# Patient Record
Sex: FEMALE | Race: BLACK OR AFRICAN AMERICAN | NOT HISPANIC OR LATINO | URBAN - METROPOLITAN AREA
[De-identification: names, ages, dates, MRNs, and addresses within clinical notes are randomized per-mention and may not be internally consistent; named-entity substitution may affect disease eponyms.]

---

## 2017-08-31 ENCOUNTER — EMERGENCY (EMERGENCY)
Facility: HOSPITAL | Age: 47
LOS: 0 days | Discharge: HOME | End: 2017-08-31
Attending: EMERGENCY MEDICINE
Payer: MEDICAID

## 2017-08-31 VITALS
HEART RATE: 88 BPM | HEIGHT: 66 IN | SYSTOLIC BLOOD PRESSURE: 129 MMHG | DIASTOLIC BLOOD PRESSURE: 73 MMHG | TEMPERATURE: 99 F | RESPIRATION RATE: 16 BRPM | OXYGEN SATURATION: 100 % | WEIGHT: 153 LBS

## 2017-08-31 VITALS
HEART RATE: 85 BPM | SYSTOLIC BLOOD PRESSURE: 122 MMHG | RESPIRATION RATE: 15 BRPM | DIASTOLIC BLOOD PRESSURE: 75 MMHG | OXYGEN SATURATION: 100 % | TEMPERATURE: 97 F

## 2017-08-31 DIAGNOSIS — R53.1 WEAKNESS: ICD-10-CM

## 2017-08-31 DIAGNOSIS — D64.9 ANEMIA, UNSPECIFIED: ICD-10-CM

## 2017-08-31 DIAGNOSIS — R42 DIZZINESS AND GIDDINESS: ICD-10-CM

## 2017-08-31 LAB
ALBUMIN SERPL ELPH-MCNC: 3.6 G/DL — SIGNIFICANT CHANGE UP (ref 3.3–5)
ALP SERPL-CCNC: 67 U/L — SIGNIFICANT CHANGE UP (ref 40–120)
ALT FLD-CCNC: 19 U/L — SIGNIFICANT CHANGE UP (ref 12–78)
ANION GAP SERPL CALC-SCNC: 10 MMOL/L — SIGNIFICANT CHANGE UP (ref 5–17)
APPEARANCE UR: CLEAR — SIGNIFICANT CHANGE UP
AST SERPL-CCNC: 18 U/L — SIGNIFICANT CHANGE UP (ref 15–37)
BASOPHILS # BLD AUTO: 0.1 K/UL — SIGNIFICANT CHANGE UP (ref 0–0.2)
BASOPHILS NFR BLD AUTO: 1.2 % — SIGNIFICANT CHANGE UP (ref 0–2)
BILIRUB SERPL-MCNC: 0.1 MG/DL — LOW (ref 0.2–1.2)
BILIRUB UR-MCNC: NEGATIVE — SIGNIFICANT CHANGE UP
BUN SERPL-MCNC: 9 MG/DL — SIGNIFICANT CHANGE UP (ref 7–23)
CALCIUM SERPL-MCNC: 8.9 MG/DL — SIGNIFICANT CHANGE UP (ref 8.5–10.1)
CHLORIDE SERPL-SCNC: 106 MMOL/L — SIGNIFICANT CHANGE UP (ref 96–108)
CO2 SERPL-SCNC: 26 MMOL/L — SIGNIFICANT CHANGE UP (ref 22–31)
COLOR SPEC: YELLOW — SIGNIFICANT CHANGE UP
CREAT SERPL-MCNC: 0.61 MG/DL — SIGNIFICANT CHANGE UP (ref 0.5–1.3)
DIFF PNL FLD: NEGATIVE — SIGNIFICANT CHANGE UP
EOSINOPHIL # BLD AUTO: 0.1 K/UL — SIGNIFICANT CHANGE UP (ref 0–0.5)
EOSINOPHIL NFR BLD AUTO: 1.5 % — SIGNIFICANT CHANGE UP (ref 0–6)
GLUCOSE SERPL-MCNC: 93 MG/DL — SIGNIFICANT CHANGE UP (ref 70–99)
GLUCOSE UR QL: NEGATIVE MG/DL — SIGNIFICANT CHANGE UP
HCG SERPL-ACNC: <1 MIU/ML — SIGNIFICANT CHANGE UP
HCT VFR BLD CALC: 35.9 % — SIGNIFICANT CHANGE UP (ref 34.5–45)
HGB BLD-MCNC: 11.6 G/DL — SIGNIFICANT CHANGE UP (ref 11.5–15.5)
KETONES UR-MCNC: NEGATIVE — SIGNIFICANT CHANGE UP
LEUKOCYTE ESTERASE UR-ACNC: NEGATIVE — SIGNIFICANT CHANGE UP
LIDOCAIN IGE QN: 294 U/L — SIGNIFICANT CHANGE UP (ref 73–393)
LYMPHOCYTES # BLD AUTO: 3.1 K/UL — SIGNIFICANT CHANGE UP (ref 1–3.3)
LYMPHOCYTES # BLD AUTO: 35.2 % — SIGNIFICANT CHANGE UP (ref 13–44)
MCHC RBC-ENTMCNC: 28.5 PG — SIGNIFICANT CHANGE UP (ref 27–34)
MCHC RBC-ENTMCNC: 32.4 GM/DL — SIGNIFICANT CHANGE UP (ref 32–36)
MCV RBC AUTO: 88 FL — SIGNIFICANT CHANGE UP (ref 80–100)
MONOCYTES # BLD AUTO: 0.7 K/UL — SIGNIFICANT CHANGE UP (ref 0–0.9)
MONOCYTES NFR BLD AUTO: 7.5 % — SIGNIFICANT CHANGE UP (ref 2–14)
NEUTROPHILS # BLD AUTO: 4.8 K/UL — SIGNIFICANT CHANGE UP (ref 1.8–7.4)
NEUTROPHILS NFR BLD AUTO: 54.6 % — SIGNIFICANT CHANGE UP (ref 43–77)
NITRITE UR-MCNC: NEGATIVE — SIGNIFICANT CHANGE UP
PH UR: 6 — SIGNIFICANT CHANGE UP (ref 5–8)
PLATELET # BLD AUTO: 279 K/UL — SIGNIFICANT CHANGE UP (ref 150–400)
POTASSIUM SERPL-MCNC: 4.3 MMOL/L — SIGNIFICANT CHANGE UP (ref 3.5–5.3)
POTASSIUM SERPL-SCNC: 4.3 MMOL/L — SIGNIFICANT CHANGE UP (ref 3.5–5.3)
PROT SERPL-MCNC: 7.1 GM/DL — SIGNIFICANT CHANGE UP (ref 6–8.3)
PROT UR-MCNC: NEGATIVE MG/DL — SIGNIFICANT CHANGE UP
RBC # BLD: 4.08 M/UL — SIGNIFICANT CHANGE UP (ref 3.8–5.2)
RBC # FLD: 12.7 % — SIGNIFICANT CHANGE UP (ref 11–15)
SODIUM SERPL-SCNC: 142 MMOL/L — SIGNIFICANT CHANGE UP (ref 135–145)
SP GR SPEC: 1.01 — SIGNIFICANT CHANGE UP (ref 1.01–1.02)
UROBILINOGEN FLD QL: NEGATIVE MG/DL — SIGNIFICANT CHANGE UP
WBC # BLD: 8.8 K/UL — SIGNIFICANT CHANGE UP (ref 3.8–10.5)
WBC # FLD AUTO: 8.8 K/UL — SIGNIFICANT CHANGE UP (ref 3.8–10.5)

## 2017-08-31 PROCEDURE — 99284 EMERGENCY DEPT VISIT MOD MDM: CPT

## 2017-08-31 RX ORDER — SODIUM CHLORIDE 9 MG/ML
1000 INJECTION INTRAMUSCULAR; INTRAVENOUS; SUBCUTANEOUS ONCE
Qty: 0 | Refills: 0 | Status: COMPLETED | OUTPATIENT
Start: 2017-08-31 | End: 2017-08-31

## 2017-08-31 RX ADMIN — SODIUM CHLORIDE 1000 MILLILITER(S): 9 INJECTION INTRAMUSCULAR; INTRAVENOUS; SUBCUTANEOUS at 16:30

## 2017-08-31 NOTE — ED PROVIDER NOTE - OBJECTIVE STATEMENT
Pt is a 48 yo lady with no significant past medical history who presents to the ED with generalized weakness for the past 2 weeks. She has felt lightheaded, no loc. No n/v/d, has had no fevers, no dysuria. She had a D and C for fibroids 8/1 and had vaginal bleeding earlier, and then now has spotting. Has a hx of anemia, never needed blood transfusion.

## 2017-08-31 NOTE — ED ADULT NURSE NOTE - OBJECTIVE STATEMENT
pt alert and oriented x4, pt c/o generalised weakness, pt post d/c 1 month ago with on and off spotting states she is also anemic

## 2017-08-31 NOTE — ED ADULT TRIAGE NOTE - CHIEF COMPLAINT QUOTE
Pt c/o generalize weakness for a few weeks since having D&C 1 month ago. Pt c/o sob on exertion. Pt states she have trouble getting words out for a few weeks. Pt denies any chest pain at triage, no apparent respiratory distress noted

## 2017-09-01 LAB
CULTURE RESULTS: NO GROWTH — SIGNIFICANT CHANGE UP
SPECIMEN SOURCE: SIGNIFICANT CHANGE UP

## 2018-01-25 ENCOUNTER — EMERGENCY (EMERGENCY)
Facility: HOSPITAL | Age: 48
LOS: 0 days | Discharge: HOME | End: 2018-01-25
Attending: EMERGENCY MEDICINE
Payer: MEDICAID

## 2018-01-25 VITALS
TEMPERATURE: 98 F | RESPIRATION RATE: 16 BRPM | OXYGEN SATURATION: 99 % | SYSTOLIC BLOOD PRESSURE: 113 MMHG | HEART RATE: 74 BPM | DIASTOLIC BLOOD PRESSURE: 61 MMHG

## 2018-01-25 VITALS
SYSTOLIC BLOOD PRESSURE: 110 MMHG | HEIGHT: 66 IN | RESPIRATION RATE: 18 BRPM | TEMPERATURE: 99 F | WEIGHT: 154.98 LBS | DIASTOLIC BLOOD PRESSURE: 66 MMHG | HEART RATE: 81 BPM | OXYGEN SATURATION: 99 %

## 2018-01-25 DIAGNOSIS — M25.512 PAIN IN LEFT SHOULDER: ICD-10-CM

## 2018-01-25 DIAGNOSIS — M79.1 MYALGIA: ICD-10-CM

## 2018-01-25 DIAGNOSIS — E23.6 OTHER DISORDERS OF PITUITARY GLAND: ICD-10-CM

## 2018-01-25 DIAGNOSIS — D64.9 ANEMIA, UNSPECIFIED: ICD-10-CM

## 2018-01-25 DIAGNOSIS — G89.29 OTHER CHRONIC PAIN: ICD-10-CM

## 2018-01-25 PROCEDURE — 99284 EMERGENCY DEPT VISIT MOD MDM: CPT

## 2018-01-25 PROCEDURE — 93010 ELECTROCARDIOGRAM REPORT: CPT

## 2018-01-25 PROCEDURE — 73030 X-RAY EXAM OF SHOULDER: CPT | Mod: 26,LT

## 2018-01-25 RX ORDER — BROMOCRIPTINE MESYLATE 5 MG/1
0 CAPSULE ORAL
Qty: 0 | Refills: 0 | COMMUNITY

## 2018-01-25 RX ORDER — IBUPROFEN 200 MG
1 TABLET ORAL
Qty: 28 | Refills: 0 | OUTPATIENT
Start: 2018-01-25 | End: 2018-01-31

## 2018-01-25 NOTE — ED ADULT NURSE NOTE - OBJECTIVE STATEMENT
received er bed 21 c/o pain l side of neck radiating to l shoulder and l chest intermittantly x 2 weeks, worse last night seen at pmd for same c/o 2 days ago with labs done, results pending denies sob at present, states dyspnea upon exertion lungs clear bilat denies known injury or recent trauma

## 2018-01-25 NOTE — ED ADULT TRIAGE NOTE - CHIEF COMPLAINT QUOTE
Complains of intermittent  left shoulder pain radiating to the left arm with associated numbness x 2 weeks. denies any injury. Complains of intermittent  left shoulder pain radiating to the left arm with associated numbness x 2 weeks. denies any injury. Denies any chest pain

## 2018-01-25 NOTE — ED ADULT NURSE NOTE - CHIEF COMPLAINT QUOTE
Complains of intermittent  left shoulder pain radiating to the left arm with associated numbness x 2 weeks. denies any injury. Denies any chest pain

## 2018-01-25 NOTE — ED PROVIDER NOTE - OBJECTIVE STATEMENT
Pt is a 48 yo lady with a pmhx of pre dm who presents to the ED with l. shoulder pain for 2 weeks. Pain shoots down l. arm intermittently. Sore. No chest pain, no sob. 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 No numbness or tingling. No neck pain, no hx of dvt/pe.

## 2022-02-24 NOTE — ED ADULT NURSE NOTE - FALL HARM RISK TYPE OF ASSESSMENT
Per EMS sent to ED per  request to be evaluated for possible UTI and concerns about marijuana use daily.     Admission

## 2022-03-09 ENCOUNTER — OFFICE VISIT (OUTPATIENT)
Dept: URBAN - METROPOLITAN AREA CLINIC 44 | Facility: CLINIC | Age: 52
End: 2022-03-09

## 2022-03-31 ENCOUNTER — OFFICE VISIT (OUTPATIENT)
Dept: URBAN - METROPOLITAN AREA CLINIC 46 | Facility: CLINIC | Age: 52
End: 2022-03-31

## 2022-03-31 RX ORDER — NEOMYCIN AND POLYMYXIN B SULFATES AND DEXAMETHASONE 3.5; 10000; 1 MG/G; [IU]/G; MG/G
APPLY AS DIRECTED IN BOTH EYES DAILY AT BEDTIME OINTMENT OPHTHALMIC
Qty: 4 | Refills: 0 | Status: ACTIVE | COMMUNITY

## 2022-05-06 ENCOUNTER — OFFICE VISIT (OUTPATIENT)
Dept: URBAN - METROPOLITAN AREA CLINIC 46 | Facility: CLINIC | Age: 52
End: 2022-05-06

## 2022-05-31 ENCOUNTER — CLAIMS CREATED FROM THE CLAIM WINDOW (OUTPATIENT)
Dept: URBAN - METROPOLITAN AREA CLINIC 48 | Facility: CLINIC | Age: 52
End: 2022-05-31
Payer: COMMERCIAL

## 2022-05-31 ENCOUNTER — LAB OUTSIDE AN ENCOUNTER (OUTPATIENT)
Dept: URBAN - METROPOLITAN AREA CLINIC 48 | Facility: CLINIC | Age: 52
End: 2022-05-31

## 2022-05-31 ENCOUNTER — DASHBOARD ENCOUNTERS (OUTPATIENT)
Age: 52
End: 2022-05-31

## 2022-05-31 VITALS
WEIGHT: 162.2 LBS | DIASTOLIC BLOOD PRESSURE: 77 MMHG | HEIGHT: 66 IN | SYSTOLIC BLOOD PRESSURE: 117 MMHG | HEART RATE: 66 BPM | BODY MASS INDEX: 26.07 KG/M2 | TEMPERATURE: 97.7 F

## 2022-05-31 DIAGNOSIS — K62.5 RECTAL BLEEDING: ICD-10-CM

## 2022-05-31 DIAGNOSIS — L29.0 RECTAL ITCHING: ICD-10-CM

## 2022-05-31 DIAGNOSIS — R09.89 GLOBUS SENSATION: ICD-10-CM

## 2022-05-31 DIAGNOSIS — K59.01 SLOW TRANSIT CONSTIPATION: ICD-10-CM

## 2022-05-31 DIAGNOSIS — Z86.010 PERSONAL HISTORY OF COLONIC POLYPS: ICD-10-CM

## 2022-05-31 DIAGNOSIS — K21.9 GASTROESOPHAGEAL REFLUX DISEASE WITHOUT ESOPHAGITIS: ICD-10-CM

## 2022-05-31 DIAGNOSIS — K59.09 CHANGE IN BOWEL MOVEMENTS INTERMITTENT CONSTIPATION. URGENCY IN THE MORNING.: ICD-10-CM

## 2022-05-31 DIAGNOSIS — Z83.79 FAMILY HISTORY OF BARRETT'S ESOPHAGUS: ICD-10-CM

## 2022-05-31 PROCEDURE — 99204 OFFICE O/P NEW MOD 45 MIN: CPT | Performed by: NURSE PRACTITIONER

## 2022-05-31 PROCEDURE — 99204 OFFICE O/P NEW MOD 45 MIN: CPT | Performed by: INTERNAL MEDICINE

## 2022-05-31 RX ORDER — LINACLOTIDE 145 UG/1
1 CAPSULE AT LEAST 30 MINUTES BEFORE THE FIRST MEAL OF THE DAY ON AN EMPTY STOMACH CAPSULE, GELATIN COATED ORAL ONCE A DAY
Qty: 30 | Refills: 6 | OUTPATIENT

## 2022-05-31 RX ORDER — HYDROCORTISONE 25 MG/G
1 APPLICATION CREAM TOPICAL TWICE A DAY
Qty: 1 TUBE | Refills: 2 | OUTPATIENT

## 2022-05-31 RX ORDER — LINACLOTIDE 145 UG/1
1 CAPSULE AT LEAST 30 MINUTES BEFORE THE FIRST MEAL OF THE DAY ON AN EMPTY STOMACH CAPSULE, GELATIN COATED ORAL ONCE A DAY
Status: ON HOLD | COMMUNITY

## 2022-05-31 RX ORDER — PANTOPRAZOLE SODIUM 40 MG/1
1 TABLET. TAKE 30 MINUTES BEFORE BREAKFAST TABLET, DELAYED RELEASE ORAL ONCE A DAY
Qty: 30 | Refills: 6 | OUTPATIENT

## 2022-05-31 RX ORDER — OMEPRAZOLE 20 MG/1
1 CAPSULE 30 MINUTES BEFORE MORNING MEAL CAPSULE, DELAYED RELEASE ORAL ONCE A DAY
Status: ON HOLD | COMMUNITY

## 2022-05-31 NOTE — HPI-TODAY'S VISIT:
51 year old female presents for evaluation of constipation. The patient has had chronic constipation for years. She used to take Linzess but insurance has not covered it . Last colonoscopy was in [place] in 2019 and she was told she had polyps and was to come back in 5 years. Bowel movements are once a week with straining and hard stool. She has noted blood on the tissue along with rectal itching, When she eats, she feels esophageal spasms and globus sensation. Her sister has GERD and Garza's esophagus. The patient was referred by Dr. Chu at [facility] in [place] and a copy of this document is being forwarded to the referring provider.

## 2022-06-01 PROBLEM — 35298007: Status: ACTIVE | Noted: 2022-05-31

## 2022-06-01 PROBLEM — 266435005: Status: ACTIVE | Noted: 2022-05-31

## 2022-06-01 PROBLEM — 267103008: Status: ACTIVE | Noted: 2022-05-31

## 2022-06-01 PROBLEM — 12063002: Status: ACTIVE | Noted: 2022-05-31

## 2022-06-01 PROBLEM — 428283002: Status: ACTIVE | Noted: 2022-05-31

## 2022-06-01 PROBLEM — 16409831000119105: Status: ACTIVE | Noted: 2022-05-31

## 2022-06-01 PROBLEM — 90446007: Status: ACTIVE | Noted: 2022-05-31

## 2022-06-14 ENCOUNTER — OFFICE VISIT (OUTPATIENT)
Dept: URBAN - METROPOLITAN AREA SURGERY CENTER 28 | Facility: SURGERY CENTER | Age: 52
End: 2022-06-14
Payer: COMMERCIAL

## 2022-06-14 ENCOUNTER — CLAIMS CREATED FROM THE CLAIM WINDOW (OUTPATIENT)
Dept: URBAN - METROPOLITAN AREA CLINIC 4 | Facility: CLINIC | Age: 52
End: 2022-06-14
Payer: COMMERCIAL

## 2022-06-14 DIAGNOSIS — R12 BURNING REFLUX: ICD-10-CM

## 2022-06-14 DIAGNOSIS — K64.1 BLEEDING GRADE II HEMORRHOIDS: ICD-10-CM

## 2022-06-14 DIAGNOSIS — K29.70 GASTRITIS, UNSPECIFIED, WITHOUT BLEEDING: ICD-10-CM

## 2022-06-14 DIAGNOSIS — Z12.11 COLON CANCER SCREENING: ICD-10-CM

## 2022-06-14 DIAGNOSIS — K29.70 CHRONIC ACITVE GASTRITIS (H.PYLORI NEGATIVE): ICD-10-CM

## 2022-06-14 PROCEDURE — 88312 SPECIAL STAINS GROUP 1: CPT | Performed by: PATHOLOGY

## 2022-06-14 PROCEDURE — 43239 EGD BIOPSY SINGLE/MULTIPLE: CPT | Performed by: INTERNAL MEDICINE

## 2022-06-14 PROCEDURE — G8907 PT DOC NO EVENTS ON DISCHARG: HCPCS | Performed by: INTERNAL MEDICINE

## 2022-06-14 PROCEDURE — G0121 COLON CA SCRN NOT HI RSK IND: HCPCS | Performed by: INTERNAL MEDICINE

## 2022-06-14 PROCEDURE — 88305 TISSUE EXAM BY PATHOLOGIST: CPT | Performed by: PATHOLOGY

## 2022-06-14 RX ORDER — LINACLOTIDE 145 UG/1
1 CAPSULE AT LEAST 30 MINUTES BEFORE THE FIRST MEAL OF THE DAY ON AN EMPTY STOMACH CAPSULE, GELATIN COATED ORAL ONCE A DAY
Status: ON HOLD | COMMUNITY

## 2022-06-14 RX ORDER — HYDROCORTISONE 25 MG/G
1 APPLICATION CREAM TOPICAL TWICE A DAY
Qty: 1 TUBE | Refills: 2 | Status: ACTIVE | COMMUNITY

## 2022-06-14 RX ORDER — PANTOPRAZOLE SODIUM 40 MG/1
1 TABLET. TAKE 30 MINUTES BEFORE BREAKFAST TABLET, DELAYED RELEASE ORAL ONCE A DAY
Qty: 30 | Refills: 6 | Status: ACTIVE | COMMUNITY

## 2022-06-14 RX ORDER — LINACLOTIDE 145 UG/1
1 CAPSULE AT LEAST 30 MINUTES BEFORE THE FIRST MEAL OF THE DAY ON AN EMPTY STOMACH CAPSULE, GELATIN COATED ORAL ONCE A DAY
Qty: 30 | Refills: 6 | Status: ACTIVE | COMMUNITY

## 2022-06-14 RX ORDER — OMEPRAZOLE 20 MG/1
1 CAPSULE 30 MINUTES BEFORE MORNING MEAL CAPSULE, DELAYED RELEASE ORAL ONCE A DAY
Status: ON HOLD | COMMUNITY

## 2022-06-21 ENCOUNTER — OFFICE VISIT (OUTPATIENT)
Dept: URBAN - METROPOLITAN AREA SURGERY CENTER 28 | Facility: SURGERY CENTER | Age: 52
End: 2022-06-21

## 2023-09-26 ENCOUNTER — OFFICE VISIT (OUTPATIENT)
Dept: URBAN - METROPOLITAN AREA CLINIC 48 | Facility: CLINIC | Age: 53
End: 2023-09-26

## 2024-05-21 NOTE — ED PROVIDER NOTE - NS ED NOTE AC HIGH RISK COUNTRIES
Chief Complaint   Patient presents with    Chest Pain     L    Shortness of Breath     And cough    Shoulder Pain     L. Pt. Family reports they are unsure if she has recently fallen. Reports recent sx to L shoulder last month.      Physical Exam  Pulmonary:      Effort: Pulmonary effort is normal.   Skin:     General: Skin is warm and dry.   Neurological:      Mental Status: She is alert.         
No

## 2025-03-03 NOTE — ED ADULT NURSE NOTE - FALL HARM RISK CONCLUSION
Transitional Care Management     Patient was re-admitted to [facility] on 3/1/25 for Multifocal pneumonia.  Current CT episode closed due to re-admission.  ADT Readmission form completed on patient.    Universal Safety Interventions